# Patient Record
Sex: FEMALE | Race: WHITE | NOT HISPANIC OR LATINO
[De-identification: names, ages, dates, MRNs, and addresses within clinical notes are randomized per-mention and may not be internally consistent; named-entity substitution may affect disease eponyms.]

---

## 2019-03-05 PROBLEM — Z00.00 ENCOUNTER FOR PREVENTIVE HEALTH EXAMINATION: Status: ACTIVE | Noted: 2019-03-05

## 2019-03-11 PROBLEM — M25.561 CHRONIC PAIN OF BOTH KNEES: Status: ACTIVE | Noted: 2019-03-11

## 2019-03-12 ENCOUNTER — APPOINTMENT (OUTPATIENT)
Dept: ORTHOPEDIC SURGERY | Facility: CLINIC | Age: 59
End: 2019-03-12
Payer: COMMERCIAL

## 2019-03-12 VITALS
DIASTOLIC BLOOD PRESSURE: 84 MMHG | SYSTOLIC BLOOD PRESSURE: 135 MMHG | HEART RATE: 78 BPM | WEIGHT: 182 LBS | BODY MASS INDEX: 36.69 KG/M2 | HEIGHT: 59 IN

## 2019-03-12 DIAGNOSIS — G89.29 PAIN IN RIGHT KNEE: ICD-10-CM

## 2019-03-12 DIAGNOSIS — M25.561 PAIN IN RIGHT KNEE: ICD-10-CM

## 2019-03-12 DIAGNOSIS — M25.562 PAIN IN RIGHT KNEE: ICD-10-CM

## 2019-03-12 PROCEDURE — 73564 X-RAY EXAM KNEE 4 OR MORE: CPT | Mod: 50

## 2019-03-12 PROCEDURE — 99204 OFFICE O/P NEW MOD 45 MIN: CPT | Mod: 25

## 2019-03-12 PROCEDURE — 20610 DRAIN/INJ JOINT/BURSA W/O US: CPT | Mod: 50

## 2019-03-12 RX ORDER — VALACYCLOVIR 1 G/1
1 TABLET, FILM COATED ORAL
Qty: 20 | Refills: 0 | Status: ACTIVE | COMMUNITY
Start: 2018-10-25

## 2019-03-12 RX ORDER — OMEPRAZOLE 20 MG/1
20 CAPSULE, DELAYED RELEASE ORAL
Qty: 30 | Refills: 0 | Status: ACTIVE | COMMUNITY
Start: 2018-11-19

## 2019-03-12 RX ORDER — CEFDINIR 300 MG/1
300 CAPSULE ORAL
Qty: 20 | Refills: 0 | Status: ACTIVE | COMMUNITY
Start: 2019-01-22

## 2019-03-12 RX ORDER — LOSARTAN POTASSIUM AND HYDROCHLOROTHIAZIDE 12.5; 5 MG/1; MG/1
50-12.5 TABLET ORAL
Qty: 90 | Refills: 0 | Status: ACTIVE | COMMUNITY
Start: 2017-10-24

## 2019-03-12 RX ORDER — PRAVASTATIN SODIUM 20 MG/1
20 TABLET ORAL
Qty: 90 | Refills: 0 | Status: ACTIVE | COMMUNITY
Start: 2017-10-24

## 2019-03-12 RX ORDER — ROSUVASTATIN CALCIUM 5 MG/1
5 TABLET, FILM COATED ORAL
Qty: 90 | Refills: 0 | Status: ACTIVE | COMMUNITY
Start: 2018-10-25

## 2019-03-12 RX ORDER — MELOXICAM 15 MG/1
15 TABLET ORAL DAILY
Qty: 30 | Refills: 2 | Status: ACTIVE | COMMUNITY
Start: 2019-03-12 | End: 1900-01-01

## 2019-03-12 NOTE — HISTORY OF PRESENT ILLNESS
[de-identified] : This is very nice 59-year-old female experiencing chronic bilateral knee pain, which is severe in intensity.  There is more pain in the right and left knee.  The pain substantially limits activities of daily living. Walking tolerance is reduced.  Aleve does not help to improve the pain although she only takes this intermittently.  She has difficulty walking up and down stairs and her knee is giving way.  She does not use a cane or walker.  She does not use a knee brace.  The pain does not radiate down the leg to the feet and it is not associated with numbness or tingling or weakness.  She has not tried physical therapy for this.  She has not tried an intra-articular cortisone injection.

## 2019-03-12 NOTE — PHYSICAL EXAM
[de-identified] : Patient is well nourished, well-developed, in no acute distress, with appropriate mood and affect. The patient is oriented to time, place, and person. Respirations are even and unlabored. Gait evaluation does reveal a limp. There is no inguinal adenopathy. Bilateral limbs are well-perfused, without skin lesions, shows a grossly normal motor and sensory examination. The right knee motion is significantly reduced and does cause significant pain. The right knee moves from 0-125 degrees. The knee is stable within that range-of-motion to AP and ML stress. The alignment of the knee is 5 degrees varus. Muscle strength is normal. Pedal pulses are palpable. Hip examination was negative. The left knee motion is significantly reduced and does cause significant pain. The left knee moves from 0-125 degrees. The knee is stable within that range-of-motion to AP and ML stress. The alignment of the knee is 5 degrees varus. Muscle strength is normal. Pedal pulses are palpable. Hip examination was negative. [de-identified] : Long standing knee, AP knee, lateral knee, and patellar views of the bilateral knee were ordered and taken in the office and demonstrate degenerative joint disease of the knee with joint space narrowing, osteophyte formation, and subchondral sclerosis.

## 2019-03-12 NOTE — DISCUSSION/SUMMARY
[de-identified] : This patient has bilateral knee osteoarthritis.  The patient is not an appropriate candidate for total knee arthroplasty at this time. An extensive discussion was conducted on the natural history of the disease and the variety of surgical and non-surgical options available to the patient including, but not limited to non-steroidal anti-inflammatory medications, steroid injections, physical therapy, maintenance of ideal body weight, and reduction of activity.  I recommended a course of Mobic and physical therapy.  I also recommended use of a neoprene sleeve knee brace.  Today we performed a bilateral knee intra-articular cortisone injection.  The patient will schedule an appointment as needed.\par \par Informed consent for the bilateral knee injection was obtained. All questions were answered. A time out was performed. The bilateral knee was prepped and draped in sterile fashion. Using sterile technique, the bilateral knee was injection of 1 cc of Kenalog, 4cc of 1% lidocaine, 2cc of 0.5% marcaine using a 21-gauge needle. A sterile dressing was applied. Post injection instructions were reviewed. The patient tolerated the procedure well.\par

## 2019-05-21 ENCOUNTER — APPOINTMENT (OUTPATIENT)
Dept: ORTHOPEDIC SURGERY | Facility: CLINIC | Age: 59
End: 2019-05-21
Payer: COMMERCIAL

## 2019-05-21 PROCEDURE — 99214 OFFICE O/P EST MOD 30 MIN: CPT

## 2019-05-22 NOTE — PHYSICAL EXAM
[de-identified] : Patient is well nourished, well-developed, in no acute distress, with appropriate mood and affect. The patient is oriented to time, place, and person. Respirations are even and unlabored. Gait evaluation does reveal a limp. There is no inguinal adenopathy. Bilateral limbs are well-perfused, without skin lesions, shows a grossly normal motor and sensory examination. The right knee motion is significantly reduced and does cause significant pain. The right knee moves from 0-125 degrees. The knee is stable within that range-of-motion to AP and ML stress. The alignment of the knee is 5 degrees varus. Muscle strength is normal. Pedal pulses are palpable. Hip examination was negative. The left knee motion is significantly reduced and does cause significant pain. The left knee moves from 0-125 degrees. The knee is stable within that range-of-motion to AP and ML stress. The alignment of the knee is 5 degrees varus. Muscle strength is normal. Pedal pulses are palpable. Hip examination was negative. [de-identified] : Long standing knee, AP knee, lateral knee, and patellar views of the bilateral knee were reviewed from the previous visit and demonstrate severe degenerative joint disease of the knee with joint space narrowing, osteophyte formation, and subchondral sclerosis.

## 2019-05-22 NOTE — HISTORY OF PRESENT ILLNESS
[de-identified] : This is very nice 59-year-old female experiencing chronic bilateral knee pain, which is severe in intensity.  She is here for a reevaluation of her pain.  I have previously diagnosed her with severe bilateral knee osteoarthritis.  There is more pain in the right and left knee.  The pain substantially limits activities of daily living. Walking tolerance is reduced.  Mobic does help mildly.  She has difficulty walking up and down stairs and her knee is giving way.  She does not use a cane or walker.  A knee brace has been helpful in improving her symptoms.  The pain does not radiate down the leg to the feet and it is not associated with numbness or tingling or weakness.  She tried physical therapy which did help somewhat.  At the last visit we tried an intra-articular cortisone injection which not only did not help but caused her an adverse reaction of flushing and feeling unwell for the next 48 hours after the injection.  Symptoms are intolerable at this time in the bilateral knees and she would like to proceed with surgery.

## 2019-05-22 NOTE — DISCUSSION/SUMMARY
[de-identified] : This patient has severe bilateral knee osteoarthritis in the right knee is more symptomatic than the left.  She has failed a course of conservative management and would like to proceed with a right total knee arthroplasty.  We will utilize computer navigation for assistance given her short stature making it difficult to properly produce intraoperative bone cuts.  After allowing a minimum of 3 months for healing we will then proceed with a staged left total knee arthroplasty.\par \par The patient is an appropriate candidate for consideration of right total knee replacement. An extensive discussion was conducted of the natural history of the disease and the variety of surgical and non-surgical treatment options available to the patient. A risk/benefit analysis was discussed with the patient reviewing the advantages and disadvantages of surgical intervention at this time. Both the level and length of the patient's pain have made additional conservative treatment measures consisting of physical therapy, corticosteroids, and/or viscosupplementation contraindicated. A full explanation was given of the nature and the purpose of the procedure and anesthesia, its benefits, possible alternative methods of diagnosis or treatment, the risks involved, the possibility of complications, the foreseeable consequences of the procedure and the possible results of the non-treatment.\par \par No guarantee or assurance was made as to the results that may be obtained. Specifically, the risks were identified to include, but are not limited to the following: Infection, phlebitis, pulmonary embolism, death, paralysis, dislocation, pain, stiffness, instability, limp, weakness, breakage, leg-length inequality, uncontrolled bleeding, nerve injury, blood vessel injury, pressure sores, anesthetic risks, delayed healing of wound and bone, and wear and loosening. Further discussion was undertaken with the patient about the details of surgical preparation, treatment, and postoperative rehabilitation including medical clearance, autotransfusion, the hospital course, and the postoperative rehabilitation involved. All in all, I feel that this patient is a good candidate for surgical reconstruction.\par \par The patient does understand that given her young age these implants have a limited life expectancy.  She may require 1 or 2 revisions of her total knee arthroplasty in her lifetime.

## 2019-07-11 ENCOUNTER — OUTPATIENT (OUTPATIENT)
Dept: OUTPATIENT SERVICES | Facility: HOSPITAL | Age: 59
LOS: 1 days | Discharge: HOME | End: 2019-07-11

## 2019-07-11 VITALS
HEIGHT: 59 IN | TEMPERATURE: 98 F | HEART RATE: 67 BPM | WEIGHT: 180.56 LBS | RESPIRATION RATE: 16 BRPM | DIASTOLIC BLOOD PRESSURE: 82 MMHG | OXYGEN SATURATION: 97 % | SYSTOLIC BLOOD PRESSURE: 129 MMHG

## 2019-07-11 VITALS
SYSTOLIC BLOOD PRESSURE: 129 MMHG | OXYGEN SATURATION: 97 % | TEMPERATURE: 98 F | HEART RATE: 67 BPM | HEIGHT: 59.75 IN | WEIGHT: 180.56 LBS | DIASTOLIC BLOOD PRESSURE: 82 MMHG | RESPIRATION RATE: 16 BRPM

## 2019-07-11 DIAGNOSIS — M17.11 UNILATERAL PRIMARY OSTEOARTHRITIS, RIGHT KNEE: ICD-10-CM

## 2019-07-11 DIAGNOSIS — Z98.51 TUBAL LIGATION STATUS: Chronic | ICD-10-CM

## 2019-07-11 DIAGNOSIS — Z90.49 ACQUIRED ABSENCE OF OTHER SPECIFIED PARTS OF DIGESTIVE TRACT: Chronic | ICD-10-CM

## 2019-07-11 DIAGNOSIS — G47.33 OBSTRUCTIVE SLEEP APNEA (ADULT) (PEDIATRIC): ICD-10-CM

## 2019-07-11 DIAGNOSIS — Z01.818 ENCOUNTER FOR OTHER PREPROCEDURAL EXAMINATION: ICD-10-CM

## 2019-07-11 LAB
ALBUMIN SERPL ELPH-MCNC: 4 G/DL — SIGNIFICANT CHANGE UP (ref 3.3–5)
ALP SERPL-CCNC: 42 U/L — SIGNIFICANT CHANGE UP (ref 40–120)
ALT FLD-CCNC: 37 U/L — SIGNIFICANT CHANGE UP (ref 12–78)
ANION GAP SERPL CALC-SCNC: 7 MMOL/L — SIGNIFICANT CHANGE UP (ref 5–17)
APTT BLD: 31.6 SEC — SIGNIFICANT CHANGE UP (ref 27.5–36.3)
AST SERPL-CCNC: 26 U/L — SIGNIFICANT CHANGE UP (ref 15–37)
BILIRUB SERPL-MCNC: 0.8 MG/DL — SIGNIFICANT CHANGE UP (ref 0.2–1.2)
BUN SERPL-MCNC: 13 MG/DL — SIGNIFICANT CHANGE UP (ref 7–23)
CALCIUM SERPL-MCNC: 9 MG/DL — SIGNIFICANT CHANGE UP (ref 8.5–10.1)
CHLORIDE SERPL-SCNC: 106 MMOL/L — SIGNIFICANT CHANGE UP (ref 96–108)
CO2 SERPL-SCNC: 29 MMOL/L — SIGNIFICANT CHANGE UP (ref 22–31)
CREAT SERPL-MCNC: 0.88 MG/DL — SIGNIFICANT CHANGE UP (ref 0.5–1.3)
GLUCOSE SERPL-MCNC: 101 MG/DL — HIGH (ref 70–99)
HBA1C BLD-MCNC: 5.5 % — SIGNIFICANT CHANGE UP (ref 4–5.6)
HCT VFR BLD CALC: 40.7 % — SIGNIFICANT CHANGE UP (ref 34.5–45)
HCV AB S/CO SERPL IA: 0.06 S/CO — SIGNIFICANT CHANGE UP (ref 0–0.99)
HCV AB SERPL-IMP: SIGNIFICANT CHANGE UP
HGB BLD-MCNC: 13.8 G/DL — SIGNIFICANT CHANGE UP (ref 11.5–15.5)
HIV 1 & 2 AB SERPL IA.RAPID: SIGNIFICANT CHANGE UP
INR BLD: 1.03 RATIO — SIGNIFICANT CHANGE UP (ref 0.88–1.16)
MCHC RBC-ENTMCNC: 29.1 PG — SIGNIFICANT CHANGE UP (ref 27–34)
MCHC RBC-ENTMCNC: 33.9 GM/DL — SIGNIFICANT CHANGE UP (ref 32–36)
MCV RBC AUTO: 85.7 FL — SIGNIFICANT CHANGE UP (ref 80–100)
MRSA PCR RESULT.: SIGNIFICANT CHANGE UP
NRBC # BLD: 0 /100 WBCS — SIGNIFICANT CHANGE UP (ref 0–0)
PLATELET # BLD AUTO: 249 K/UL — SIGNIFICANT CHANGE UP (ref 150–400)
POTASSIUM SERPL-MCNC: 3.8 MMOL/L — SIGNIFICANT CHANGE UP (ref 3.5–5.3)
POTASSIUM SERPL-SCNC: 3.8 MMOL/L — SIGNIFICANT CHANGE UP (ref 3.5–5.3)
PROT SERPL-MCNC: 7.5 GM/DL — SIGNIFICANT CHANGE UP (ref 6–8.3)
PROTHROM AB SERPL-ACNC: 11.5 SEC — SIGNIFICANT CHANGE UP (ref 10–12.9)
RBC # BLD: 4.75 M/UL — SIGNIFICANT CHANGE UP (ref 3.8–5.2)
RBC # FLD: 13.1 % — SIGNIFICANT CHANGE UP (ref 10.3–14.5)
S AUREUS DNA NOSE QL NAA+PROBE: SIGNIFICANT CHANGE UP
SODIUM SERPL-SCNC: 142 MMOL/L — SIGNIFICANT CHANGE UP (ref 135–145)
WBC # BLD: 6.13 K/UL — SIGNIFICANT CHANGE UP (ref 3.8–10.5)
WBC # FLD AUTO: 6.13 K/UL — SIGNIFICANT CHANGE UP (ref 3.8–10.5)

## 2019-07-11 RX ORDER — ROSUVASTATIN CALCIUM 5 MG/1
1 TABLET ORAL
Qty: 0 | Refills: 0 | DISCHARGE

## 2019-07-11 NOTE — H&P PST ADULT - ASSESSMENT
scheduled for a right total knee replacement on  with Dr. Danoff CAPRINI SCORE [CLOT]    AGE RELATED RISK FACTORS                                                       MOBILITY RELATED FACTORS  [ ] Age 41-60 years                                            (1 Point)                  [ ] Bed rest                                                        (1 Point)  [ ] Age: 61-74 years                                           (2 Points)                 [ ] Plaster cast                                                   (2 Points)  [ ] Age= 75 years                                              (3 Points)                 [ ] Bed bound for more than 72 hours                 (2 Points)    DISEASE RELATED RISK FACTORS                                               GENDER SPECIFIC FACTORS  [ ] Edema in the lower extremities                       (1 Point)                  [ ] Pregnancy                                                     (1 Point)  [ ] Varicose veins                                               (1 Point)                  [ ] Post-partum < 6 weeks                                   (1 Point)             [ ] BMI > 25 Kg/m2                                            (1 Point)                  [ ] Hormonal therapy  or oral contraception          (1 Point)                 [ ] Sepsis (in the previous month)                        (1 Point)                  [ ] History of pregnancy complications                 (1 point)  [ ] Pneumonia or serious lung disease                                               [ ] Unexplained or recurrent                     (1 Point)           (in the previous month)                               (1 Point)  [ ] Abnormal pulmonary function test                     (1 Point)                 SURGERY RELATED RISK FACTORS  [ ] Acute myocardial infarction                              (1 Point)                 [ ]  Section                                             (1 Point)  [ ] Congestive heart failure (in the previous month)  (1 Point)               [ ] Minor surgery                                                  (1 Point)   [ ] Inflammatory bowel disease                             (1 Point)                 [ ] Arthroscopic surgery                                        (2 Points)  [ ] Central venous access                                      (2 Points)                [ ] General surgery lasting more than 45 minutes   (2 Points)       [ ] Stroke (in the previous month)                          (5 Points)               [ ] Elective arthroplasty                                         (5 Points)                                                                                                                                               HEMATOLOGY RELATED FACTORS                                                 TRAUMA RELATED RISK FACTORS  [ ] Prior episodes of VTE                                     (3 Points)                [ ] Fracture of the hip, pelvis, or leg                       (5 Points)  [ ] Positive family history for VTE                         (3 Points)                 [ ] Acute spinal cord injury (in the previous month)  (5 Points)  [ ] Prothrombin 69016 A                                     (3 Points)                 [ ] Paralysis  (less than 1 month)                             (5 Points)  [ ] Factor V Leiden                                             (3 Points)                  [ ] Multiple Trauma within 1 month                        (5 Points)  [ ] Lupus anticoagulants                                     (3 Points)                                                           [ ] Anticardiolipin antibodies                               (3 Points)                                                       [ ] High homocysteine in the blood                      (3 Points)                                             [ ] Other congenital or acquired thrombophilia      (3 Points)                                                [ ] Heparin induced thrombocytopenia                  (3 Points)                                          Total Score [          ] 58 yo female with OA of the right knee scheduled for a right total knee replacement on  with Dr. Danoff CAPRINI SCORE [CLOT]    AGE RELATED RISK FACTORS                                                       MOBILITY RELATED FACTORS  [ x] Age 41-60 years                                            (1 Point)                  [ ] Bed rest                                                        (1 Point)  [ ] Age: 61-74 years                                           (2 Points)                 [ ] Plaster cast                                                   (2 Points)  [ ] Age= 75 years                                              (3 Points)                 [ ] Bed bound for more than 72 hours                 (2 Points)    DISEASE RELATED RISK FACTORS                                               GENDER SPECIFIC FACTORS  [ ] Edema in the lower extremities                       (1 Point)                  [ ] Pregnancy                                                     (1 Point)  [ ] Varicose veins                                               (1 Point)                  [ ] Post-partum < 6 weeks                                   (1 Point)             [ x] BMI > 25 Kg/m2                                            (1 Point)                  [ ] Hormonal therapy  or oral contraception          (1 Point)                 [ ] Sepsis (in the previous month)                        (1 Point)                  [ ] History of pregnancy complications                 (1 point)  [ ] Pneumonia or serious lung disease                                               [ ] Unexplained or recurrent                     (1 Point)           (in the previous month)                               (1 Point)  [ ] Abnormal pulmonary function test                     (1 Point)                 SURGERY RELATED RISK FACTORS  [ ] Acute myocardial infarction                              (1 Point)                 [ ]  Section                                             (1 Point)  [ ] Congestive heart failure (in the previous month)  (1 Point)               [ ] Minor surgery                                                  (1 Point)   [ ] Inflammatory bowel disease                             (1 Point)                 [ ] Arthroscopic surgery                                        (2 Points)  [ ] Central venous access                                      (2 Points)                [ ] General surgery lasting more than 45 minutes   (2 Points)       [ ] Stroke (in the previous month)                          (5 Points)               [x ] Elective arthroplasty                                         (5 Points)                                                                                                                                               HEMATOLOGY RELATED FACTORS                                                 TRAUMA RELATED RISK FACTORS  [ ] Prior episodes of VTE                                     (3 Points)                [ ] Fracture of the hip, pelvis, or leg                       (5 Points)  [ ] Positive family history for VTE                         (3 Points)                 [ ] Acute spinal cord injury (in the previous month)  (5 Points)  [ ] Prothrombin 47745 A                                     (3 Points)                 [ ] Paralysis  (less than 1 month)                             (5 Points)  [ ] Factor V Leiden                                             (3 Points)                  [ ] Multiple Trauma within 1 month                        (5 Points)  [ ] Lupus anticoagulants                                     (3 Points)                                                           [ ] Anticardiolipin antibodies                               (3 Points)                                                       [ ] High homocysteine in the blood                      (3 Points)                                             [ ] Other congenital or acquired thrombophilia      (3 Points)                                                [ ] Heparin induced thrombocytopenia                  (3 Points)                                          Total Score [        7  ]

## 2019-07-11 NOTE — H&P PST ADULT - HISTORY OF PRESENT ILLNESS
60 yo female with HTN ans JESSE, presents to PST scheduled for a right total knee replacement on 8/2 with Dr. Alexis 60 yo obese female with HTN ans JESSE, presents to PST scheduled for a right total knee replacement on 8/2 with Dr. Alexis. C/O pain with ROM. Denies pain today.

## 2019-07-11 NOTE — OCCUPATIONAL THERAPY INITIAL EVALUATION ADULT - TRANSFER SAFETY CONCERNS NOTED: SIT/STAND, REHAB EVAL
decreased sequencing ability/decreased balance during turns/decreased weight-shifting ability/decreased step length

## 2019-07-11 NOTE — OCCUPATIONAL THERAPY INITIAL EVALUATION ADULT - FINE MOTOR COORDINATION, FINE MOTOR COORDINATION TESTS, OT EVAL
Patient-specific activity scoring scheme (Point to one number): 0 -------5-------- 10 (0) =Unable to perform activity (10) -- Able to perform activity at the same level as before injury or problem. Activity: static positions______5______

## 2019-07-11 NOTE — OCCUPATIONAL THERAPY INITIAL EVALUATION ADULT - SOCIAL CONCERNS
None/Patients spouse is able to assist after surgery with any daily tasks and needs when patient comes home

## 2019-07-11 NOTE — OCCUPATIONAL THERAPY INITIAL EVALUATION ADULT - ADDITIONAL COMMENTS
Patient lives in a private house with 7-8 steps to enter with bilateral handrails that are close together. Once inside, the patients bedroom and bathroom is on the main level when entering. The patient bathroom has a walk in shower stall with a retractable shower head with a regular toilet. The patient reports that she can fit a 3/1 commode over the toilet. The patient ambulates with no device and does not own any device for ambulation. The patient is R handed, drives and does not wear glasses. The patient daily pain at rest is a 5/10 and a 9/10 with movement. The patient reports taking no pain medicine and uses ice and exercise.  The patient reports having (+) buckling and (+) falls history.

## 2019-07-11 NOTE — H&P PST ADULT - NSICDXPROBLEM_GEN_ALL_CORE_FT
PROBLEM DIAGNOSES  Problem: Unilateral primary osteoarthritis, right knee  Assessment and Plan:     Problem: Pre-op evaluation  Assessment and Plan:     Problem: JESSE on CPAP  Assessment and Plan: PROBLEM DIAGNOSES  Problem: Unilateral primary osteoarthritis, right knee  Assessment and Plan: scheduled for a right total knee replacement on 8/2 with Dr. Alexis      Problem: Pre-op evaluation  Assessment and Plan: Labs-CBC, CMP, PT/PTT, T&S, A1c, Nose cx, and EKG  MC with Dr. aFm  Pre op and 3 day of Hibiclens instructions reviewed and given. Hold diuretic DOS. Take routine am Omeprazole DOS with sip of water. Avoid NSAIDs and OTC supplements. Verbalized understanding     Problem: JESSE on CPAP  Assessment and Plan: Close post op monitoring. Advised of a possible extended stay.

## 2019-07-11 NOTE — PHYSICAL THERAPY INITIAL EVALUATION ADULT - MODIFIED CLINICAL TEST OF SENSORY INTEGRATION IN BALANCE TEST
5x Sit to Stand Test = (no hands use) 20.34 seconds, indicating significant impairment c functional mobility & strength  ; 2 Minute Walk Test = 300 feet without devices or rest stops

## 2019-07-11 NOTE — PHYSICAL THERAPY INITIAL EVALUATION ADULT - ADDITIONAL COMMENTS
Pt lives in a private house with 7-8 steps down to basement with B handrails close together. Once inside there are no further steps to negotiate. Pt has a walk in shower, no grab bars, retractable shower, standard height toilet. Pt reports that a 3:1 commode can fit over toilet. (+) hx of falls and buckling. Pt does not have/use dme. Pain is 5/10 at rest and can increase to 9/10, worse with static positions, ambulation. Pt gets relief with ice and exercise, does not take pain medication. 12 sessions of PT were completed approximately 1 1/2 months ago. Pt does not wear glasses, R hand dominant, drives, does not wear hearing aids.

## 2019-07-11 NOTE — H&P PST ADULT - NSICDXPASTMEDICALHX_GEN_ALL_CORE_FT
PAST MEDICAL HISTORY:  Fatty liver     HTN (hypertension)     JESSE on CPAP     Unilateral primary osteoarthritis, right knee PAST MEDICAL HISTORY:  Fatty liver     HTN (hypertension)     Obese     JESSE on CPAP     Unilateral primary osteoarthritis, right knee

## 2019-07-11 NOTE — H&P PST ADULT - NSICDXPASTSURGICALHX_GEN_ALL_CORE_FT
PAST SURGICAL HISTORY:  H/O tubal ligation     History of appendectomy     History of cholecystectomy

## 2019-07-29 PROBLEM — M17.11 ARTHRITIS OF KNEE, RIGHT: Status: RESOLVED | Noted: 2019-03-12 | Resolved: 2019-07-29

## 2019-08-01 ENCOUNTER — TRANSCRIPTION ENCOUNTER (OUTPATIENT)
Age: 59
End: 2019-08-01

## 2019-08-02 ENCOUNTER — RESULT REVIEW (OUTPATIENT)
Age: 59
End: 2019-08-02

## 2019-08-02 ENCOUNTER — TRANSCRIPTION ENCOUNTER (OUTPATIENT)
Age: 59
End: 2019-08-02

## 2019-08-02 ENCOUNTER — APPOINTMENT (OUTPATIENT)
Dept: ORTHOPEDIC SURGERY | Facility: HOSPITAL | Age: 59
End: 2019-08-02

## 2019-08-02 ENCOUNTER — INPATIENT (INPATIENT)
Facility: HOSPITAL | Age: 59
LOS: 0 days | Discharge: HOME | End: 2019-08-03
Attending: ORTHOPAEDIC SURGERY | Admitting: ORTHOPAEDIC SURGERY
Payer: COMMERCIAL

## 2019-08-02 VITALS
DIASTOLIC BLOOD PRESSURE: 79 MMHG | HEART RATE: 88 BPM | HEIGHT: 59 IN | WEIGHT: 179.9 LBS | OXYGEN SATURATION: 98 % | RESPIRATION RATE: 17 BRPM | TEMPERATURE: 97 F | SYSTOLIC BLOOD PRESSURE: 140 MMHG

## 2019-08-02 DIAGNOSIS — M17.11 UNILATERAL PRIMARY OSTEOARTHRITIS, RIGHT KNEE: ICD-10-CM

## 2019-08-02 DIAGNOSIS — Z98.51 TUBAL LIGATION STATUS: Chronic | ICD-10-CM

## 2019-08-02 DIAGNOSIS — Z90.49 ACQUIRED ABSENCE OF OTHER SPECIFIED PARTS OF DIGESTIVE TRACT: Chronic | ICD-10-CM

## 2019-08-02 PROCEDURE — 20985 CPTR-ASST DIR MS PX: CPT

## 2019-08-02 PROCEDURE — 88305 TISSUE EXAM BY PATHOLOGIST: CPT | Mod: 26

## 2019-08-02 PROCEDURE — 73560 X-RAY EXAM OF KNEE 1 OR 2: CPT | Mod: 26,RT

## 2019-08-02 PROCEDURE — 27447 TOTAL KNEE ARTHROPLASTY: CPT | Mod: RT

## 2019-08-02 PROCEDURE — 88311 DECALCIFY TISSUE: CPT | Mod: 26

## 2019-08-02 RX ORDER — OXYCODONE HYDROCHLORIDE 5 MG/1
5 TABLET ORAL ONCE
Refills: 0 | Status: DISCONTINUED | OUTPATIENT
Start: 2019-08-02 | End: 2019-08-02

## 2019-08-02 RX ORDER — HYDROCHLOROTHIAZIDE 25 MG
12.5 TABLET ORAL DAILY
Refills: 0 | Status: DISCONTINUED | OUTPATIENT
Start: 2019-08-02 | End: 2019-08-03

## 2019-08-02 RX ORDER — GABAPENTIN 400 MG/1
300 CAPSULE ORAL ONCE
Refills: 0 | Status: COMPLETED | OUTPATIENT
Start: 2019-08-02 | End: 2019-08-02

## 2019-08-02 RX ORDER — FOLIC ACID 0.8 MG
1 TABLET ORAL DAILY
Refills: 0 | Status: DISCONTINUED | OUTPATIENT
Start: 2019-08-02 | End: 2019-08-03

## 2019-08-02 RX ORDER — OXYCODONE HYDROCHLORIDE 5 MG/1
10 TABLET ORAL EVERY 4 HOURS
Refills: 0 | Status: DISCONTINUED | OUTPATIENT
Start: 2019-08-02 | End: 2019-08-03

## 2019-08-02 RX ORDER — SODIUM CHLORIDE 9 MG/ML
3 INJECTION INTRAMUSCULAR; INTRAVENOUS; SUBCUTANEOUS EVERY 8 HOURS
Refills: 0 | Status: DISCONTINUED | OUTPATIENT
Start: 2019-08-02 | End: 2019-08-02

## 2019-08-02 RX ORDER — PANTOPRAZOLE SODIUM 20 MG/1
1 TABLET, DELAYED RELEASE ORAL
Qty: 60 | Refills: 0
Start: 2019-08-02 | End: 2019-08-31

## 2019-08-02 RX ORDER — MAGNESIUM HYDROXIDE 400 MG/1
30 TABLET, CHEWABLE ORAL DAILY
Refills: 0 | Status: DISCONTINUED | OUTPATIENT
Start: 2019-08-02 | End: 2019-08-03

## 2019-08-02 RX ORDER — VITAMIN E 100 UNIT
0 CAPSULE ORAL
Qty: 0 | Refills: 0 | DISCHARGE

## 2019-08-02 RX ORDER — OMEPRAZOLE 10 MG/1
0 CAPSULE, DELAYED RELEASE ORAL
Qty: 0 | Refills: 0 | DISCHARGE

## 2019-08-02 RX ORDER — OXYCODONE HYDROCHLORIDE 5 MG/1
1 TABLET ORAL
Qty: 42 | Refills: 0
Start: 2019-08-02 | End: 2019-08-08

## 2019-08-02 RX ORDER — ACETAMINOPHEN 500 MG
2 TABLET ORAL
Qty: 168 | Refills: 0
Start: 2019-08-02 | End: 2019-08-29

## 2019-08-02 RX ORDER — LANOLIN ALCOHOL/MO/W.PET/CERES
3 CREAM (GRAM) TOPICAL AT BEDTIME
Refills: 0 | Status: DISCONTINUED | OUTPATIENT
Start: 2019-08-02 | End: 2019-08-03

## 2019-08-02 RX ORDER — BENZOCAINE AND MENTHOL 5; 1 G/100ML; G/100ML
1 LIQUID ORAL EVERY 4 HOURS
Refills: 0 | Status: DISCONTINUED | OUTPATIENT
Start: 2019-08-02 | End: 2019-08-03

## 2019-08-02 RX ORDER — DIPHENHYDRAMINE HCL 50 MG
25 CAPSULE ORAL EVERY 4 HOURS
Refills: 0 | Status: DISCONTINUED | OUTPATIENT
Start: 2019-08-02 | End: 2019-08-03

## 2019-08-02 RX ORDER — DOCUSATE SODIUM 100 MG
100 CAPSULE ORAL THREE TIMES A DAY
Refills: 0 | Status: DISCONTINUED | OUTPATIENT
Start: 2019-08-02 | End: 2019-08-03

## 2019-08-02 RX ORDER — HYDROMORPHONE HYDROCHLORIDE 2 MG/ML
0.5 INJECTION INTRAMUSCULAR; INTRAVENOUS; SUBCUTANEOUS
Refills: 0 | Status: DISCONTINUED | OUTPATIENT
Start: 2019-08-02 | End: 2019-08-02

## 2019-08-02 RX ORDER — ACETAMINOPHEN 500 MG
975 TABLET ORAL ONCE
Refills: 0 | Status: COMPLETED | OUTPATIENT
Start: 2019-08-02 | End: 2019-08-02

## 2019-08-02 RX ORDER — CELECOXIB 200 MG/1
400 CAPSULE ORAL ONCE
Refills: 0 | Status: COMPLETED | OUTPATIENT
Start: 2019-08-02 | End: 2019-08-02

## 2019-08-02 RX ORDER — SENNA PLUS 8.6 MG/1
2 TABLET ORAL AT BEDTIME
Refills: 0 | Status: DISCONTINUED | OUTPATIENT
Start: 2019-08-02 | End: 2019-08-03

## 2019-08-02 RX ORDER — METOCLOPRAMIDE HCL 10 MG
10 TABLET ORAL ONCE
Refills: 0 | Status: DISCONTINUED | OUTPATIENT
Start: 2019-08-02 | End: 2019-08-02

## 2019-08-02 RX ORDER — TRAMADOL HYDROCHLORIDE 50 MG/1
1 TABLET ORAL
Qty: 28 | Refills: 0
Start: 2019-08-02 | End: 2019-08-08

## 2019-08-02 RX ORDER — SODIUM CHLORIDE 9 MG/ML
1000 INJECTION, SOLUTION INTRAVENOUS
Refills: 0 | Status: DISCONTINUED | OUTPATIENT
Start: 2019-08-02 | End: 2019-08-03

## 2019-08-02 RX ORDER — ASPIRIN/CALCIUM CARB/MAGNESIUM 324 MG
1 TABLET ORAL
Qty: 60 | Refills: 0
Start: 2019-08-02 | End: 2019-08-31

## 2019-08-02 RX ORDER — ROSUVASTATIN CALCIUM 5 MG/1
1 TABLET ORAL
Qty: 0 | Refills: 0 | DISCHARGE

## 2019-08-02 RX ORDER — PANTOPRAZOLE SODIUM 20 MG/1
40 TABLET, DELAYED RELEASE ORAL
Refills: 0 | Status: DISCONTINUED | OUTPATIENT
Start: 2019-08-02 | End: 2019-08-03

## 2019-08-02 RX ORDER — LOSARTAN POTASSIUM 100 MG/1
50 TABLET, FILM COATED ORAL DAILY
Refills: 0 | Status: DISCONTINUED | OUTPATIENT
Start: 2019-08-02 | End: 2019-08-03

## 2019-08-02 RX ORDER — ASPIRIN/CALCIUM CARB/MAGNESIUM 324 MG
81 TABLET ORAL
Refills: 0 | Status: DISCONTINUED | OUTPATIENT
Start: 2019-08-02 | End: 2019-08-03

## 2019-08-02 RX ORDER — CELECOXIB 200 MG/1
400 CAPSULE ORAL EVERY 12 HOURS
Refills: 0 | Status: DISCONTINUED | OUTPATIENT
Start: 2019-08-04 | End: 2019-08-03

## 2019-08-02 RX ORDER — CEFAZOLIN SODIUM 1 G
2000 VIAL (EA) INJECTION EVERY 8 HOURS
Refills: 0 | Status: COMPLETED | OUTPATIENT
Start: 2019-08-02 | End: 2019-08-02

## 2019-08-02 RX ORDER — ATORVASTATIN CALCIUM 80 MG/1
20 TABLET, FILM COATED ORAL AT BEDTIME
Refills: 0 | Status: DISCONTINUED | OUTPATIENT
Start: 2019-08-02 | End: 2019-08-03

## 2019-08-02 RX ORDER — ACETAMINOPHEN 500 MG
1000 TABLET ORAL ONCE
Refills: 0 | Status: COMPLETED | OUTPATIENT
Start: 2019-08-02 | End: 2019-08-02

## 2019-08-02 RX ORDER — GABAPENTIN 400 MG/1
1 CAPSULE ORAL
Qty: 28 | Refills: 0
Start: 2019-08-02 | End: 2019-08-15

## 2019-08-02 RX ORDER — ONDANSETRON 8 MG/1
4 TABLET, FILM COATED ORAL EVERY 6 HOURS
Refills: 0 | Status: DISCONTINUED | OUTPATIENT
Start: 2019-08-02 | End: 2019-08-03

## 2019-08-02 RX ORDER — UBIDECARENONE 100 MG
1 CAPSULE ORAL
Qty: 0 | Refills: 0 | DISCHARGE

## 2019-08-02 RX ORDER — CELECOXIB 200 MG/1
1 CAPSULE ORAL
Qty: 28 | Refills: 0
Start: 2019-08-02 | End: 2019-08-15

## 2019-08-02 RX ORDER — TRAMADOL HYDROCHLORIDE 50 MG/1
50 TABLET ORAL EVERY 6 HOURS
Refills: 0 | Status: DISCONTINUED | OUTPATIENT
Start: 2019-08-02 | End: 2019-08-03

## 2019-08-02 RX ORDER — POLYETHYLENE GLYCOL 3350 17 G/17G
17 POWDER, FOR SOLUTION ORAL DAILY
Refills: 0 | Status: DISCONTINUED | OUTPATIENT
Start: 2019-08-02 | End: 2019-08-03

## 2019-08-02 RX ORDER — ASCORBIC ACID 60 MG
500 TABLET,CHEWABLE ORAL
Refills: 0 | Status: DISCONTINUED | OUTPATIENT
Start: 2019-08-02 | End: 2019-08-03

## 2019-08-02 RX ORDER — SCOPALAMINE 1 MG/3D
1 PATCH, EXTENDED RELEASE TRANSDERMAL ONCE
Refills: 0 | Status: COMPLETED | OUTPATIENT
Start: 2019-08-02 | End: 2019-08-02

## 2019-08-02 RX ORDER — OXYCODONE HYDROCHLORIDE 5 MG/1
5 TABLET ORAL EVERY 4 HOURS
Refills: 0 | Status: DISCONTINUED | OUTPATIENT
Start: 2019-08-02 | End: 2019-08-03

## 2019-08-02 RX ORDER — KETOROLAC TROMETHAMINE 30 MG/ML
30 SYRINGE (ML) INJECTION EVERY 8 HOURS
Refills: 0 | Status: DISCONTINUED | OUTPATIENT
Start: 2019-08-02 | End: 2019-08-03

## 2019-08-02 RX ORDER — GABAPENTIN 400 MG/1
300 CAPSULE ORAL EVERY 12 HOURS
Refills: 0 | Status: DISCONTINUED | OUTPATIENT
Start: 2019-08-02 | End: 2019-08-03

## 2019-08-02 RX ORDER — SODIUM CHLORIDE 9 MG/ML
1000 INJECTION, SOLUTION INTRAVENOUS
Refills: 0 | Status: DISCONTINUED | OUTPATIENT
Start: 2019-08-02 | End: 2019-08-02

## 2019-08-02 RX ORDER — LOSARTAN/HYDROCHLOROTHIAZIDE 100MG-25MG
1 TABLET ORAL
Qty: 0 | Refills: 0 | DISCHARGE

## 2019-08-02 RX ORDER — ACETAMINOPHEN 500 MG
975 TABLET ORAL EVERY 8 HOURS
Refills: 0 | Status: DISCONTINUED | OUTPATIENT
Start: 2019-08-02 | End: 2019-08-03

## 2019-08-02 RX ADMIN — CELECOXIB 400 MILLIGRAM(S): 200 CAPSULE ORAL at 07:25

## 2019-08-02 RX ADMIN — LOSARTAN POTASSIUM 50 MILLIGRAM(S): 100 TABLET, FILM COATED ORAL at 14:47

## 2019-08-02 RX ADMIN — SODIUM CHLORIDE 75 MILLILITER(S): 9 INJECTION, SOLUTION INTRAVENOUS at 21:31

## 2019-08-02 RX ADMIN — SODIUM CHLORIDE 75 MILLILITER(S): 9 INJECTION, SOLUTION INTRAVENOUS at 12:52

## 2019-08-02 RX ADMIN — SODIUM CHLORIDE 75 MILLILITER(S): 9 INJECTION, SOLUTION INTRAVENOUS at 10:49

## 2019-08-02 RX ADMIN — OXYCODONE HYDROCHLORIDE 5 MILLIGRAM(S): 5 TABLET ORAL at 07:25

## 2019-08-02 RX ADMIN — SCOPALAMINE 1 PATCH: 1 PATCH, EXTENDED RELEASE TRANSDERMAL at 07:25

## 2019-08-02 RX ADMIN — OXYCODONE HYDROCHLORIDE 10 MILLIGRAM(S): 5 TABLET ORAL at 13:50

## 2019-08-02 RX ADMIN — Medication 1000 MILLIGRAM(S): at 11:05

## 2019-08-02 RX ADMIN — Medication 500 MILLIGRAM(S): at 17:28

## 2019-08-02 RX ADMIN — Medication 100 MILLIGRAM(S): at 21:31

## 2019-08-02 RX ADMIN — Medication 30 MILLIGRAM(S): at 21:34

## 2019-08-02 RX ADMIN — OXYCODONE HYDROCHLORIDE 10 MILLIGRAM(S): 5 TABLET ORAL at 21:30

## 2019-08-02 RX ADMIN — GABAPENTIN 300 MILLIGRAM(S): 400 CAPSULE ORAL at 07:25

## 2019-08-02 RX ADMIN — OXYCODONE HYDROCHLORIDE 10 MILLIGRAM(S): 5 TABLET ORAL at 22:19

## 2019-08-02 RX ADMIN — OXYCODONE HYDROCHLORIDE 10 MILLIGRAM(S): 5 TABLET ORAL at 12:53

## 2019-08-02 RX ADMIN — Medication 100 MILLIGRAM(S): at 23:43

## 2019-08-02 RX ADMIN — SCOPALAMINE 1 PATCH: 1 PATCH, EXTENDED RELEASE TRANSDERMAL at 19:00

## 2019-08-02 RX ADMIN — Medication 100 MILLIGRAM(S): at 15:48

## 2019-08-02 RX ADMIN — Medication 100 MILLIGRAM(S): at 14:47

## 2019-08-02 RX ADMIN — Medication 975 MILLIGRAM(S): at 07:24

## 2019-08-02 RX ADMIN — Medication 975 MILLIGRAM(S): at 17:27

## 2019-08-02 RX ADMIN — Medication 81 MILLIGRAM(S): at 17:28

## 2019-08-02 RX ADMIN — Medication 12.5 MILLIGRAM(S): at 14:47

## 2019-08-02 RX ADMIN — ATORVASTATIN CALCIUM 20 MILLIGRAM(S): 80 TABLET, FILM COATED ORAL at 21:29

## 2019-08-02 RX ADMIN — Medication 30 MILLIGRAM(S): at 21:29

## 2019-08-02 RX ADMIN — Medication 400 MILLIGRAM(S): at 10:51

## 2019-08-02 RX ADMIN — Medication 975 MILLIGRAM(S): at 18:27

## 2019-08-02 RX ADMIN — GABAPENTIN 300 MILLIGRAM(S): 400 CAPSULE ORAL at 17:28

## 2019-08-02 NOTE — DISCHARGE NOTE PROVIDER - CARE PROVIDERS DIRECT ADDRESSES
[email or web address]

## 2019-08-02 NOTE — PROGRESS NOTE ADULT - ASSESSMENT
A/P: 60 yo F s/p right TKA POD #0    -Patient tolerated procedure well  -Continue with post op ABX  -DVT ppx: Aspirin 81 to start today, SCDs  -Pain control  -PT/OT  -WBAT RLE, OOB to chair with assistive devices as needed  -Dispo planning, possible home tomorrow pending PT eval  -Will d/w with attending and advise if plan changes

## 2019-08-02 NOTE — DISCHARGE NOTE PROVIDER - NSDCCPCAREPLAN_GEN_ALL_CORE_FT
PRINCIPAL DISCHARGE DIAGNOSIS  Diagnosis: Osteoarthritis of right knee  Assessment and Plan of Treatment:   1.	Pain Control  2.	Walking with full weight bearing as tolerated, with assistive devices (walker/Cane as Needed)  3.	DVT Prophylaxis for 30 days. Aspirin 81 twice a day.  4.	Home exercise program and outpatient PT to start 1 week after surgery.  5.	Follow up with Dr. Alexis as outpatient in 2 Weeks after discharge from the hospital. Call office for appointment.  6.	Remove Dressing Post-Op Day 10.  7.	Ice affected area as needed  8.	Keep dressing clean and dry.

## 2019-08-02 NOTE — PROGRESS NOTE ADULT - SUBJECTIVE AND OBJECTIVE BOX
Post-Op Note    Patient seen and examined in PACU. Patient tolerated procedure well. Denies numbness/tingling of the RLE. No complaints at this time.      Vital Signs Last 24 Hrs  T(C): 36.4 (02 Aug 2019 09:56), Max: 36.4 (02 Aug 2019 09:56)  T(F): 97.5 (02 Aug 2019 09:56), Max: 97.5 (02 Aug 2019 09:56)  HR: 98 (02 Aug 2019 10:33) (88 - 102)  BP: 126/69 (02 Aug 2019 10:33) (113/56 - 140/79)  BP(mean): --  RR: 14 (02 Aug 2019 10:33) (12 - 17)  SpO2: 98% (02 Aug 2019 10:33) (96% - 98%)    PHYSICAL EXAM:  GEN: NAD  RLE:   Dressing c/d/i  L2-S1 SILT  +TA/EHL/FHL/GSC  2+ DP/PT  Compartments soft and compressible  Calves nontender bilaterally

## 2019-08-02 NOTE — DISCHARGE NOTE PROVIDER - CARE PROVIDER_API CALL
Juice Alexis)  Orthopedics  [address], [address]  [place] [address]  Phone: [phone]  Fax: [phone]  Follow Up Time:

## 2019-08-02 NOTE — PHYSICAL THERAPY INITIAL EVALUATION ADULT - ACTIVE RANGE OF MOTION EXAMINATION, REHAB EVAL
bilateral upper extremity Active ROM was WFL (within functional limits)/R knee 5-90/Left LE Active ROM was WFL (within functional limits)

## 2019-08-02 NOTE — PHYSICAL THERAPY INITIAL EVALUATION ADULT - ADDITIONAL COMMENTS
As per pre-op and reviewed with patient POD #0 to ensure accuracy: Pt lives in a private house with 7-8 steps down to basement with B handrails close together, R rail down does not begin until third step. Once inside there are no further steps to negotiate. Pt has a walk in shower, no grab bars, retractable shower, standard height toilet. Pt reports that a 3:1 commode can fit over toilet. (+) hx of falls and buckling. Pt does not have/use dme. Pain is 5/10 at rest and can increase to 9/10, worse with static positions, ambulation. Pt gets relief with ice and exercise, does not take pain medication. 12 sessions of PT were completed approximately 1 1/2 months ago. Pt does not wear glasses, R hand dominant, drives, does not wear hearing aids.

## 2019-08-02 NOTE — PROGRESS NOTE ADULT - ATTENDING COMMENTS
I agree with the above note and have personally seen and examined this patient. All pertinent films have been reviewed. Please refer to clinical documentation of the history, physical examinations, data summary, and both assessment and plan as documented above and with which I agree.    Juice Alexis MD  Attending Orthopedic Surgeon

## 2019-08-02 NOTE — PHYSICAL THERAPY INITIAL EVALUATION ADULT - GAIT TRAINING, PT EVAL
Patient will ambulate 500 feet with rolling walker independently for community ambulation in 2-3 days. Patient will ascend/descend 8 steps with R rail up/L rail down sidestepping independently in 2-3 days to get in and out of home safely.

## 2019-08-02 NOTE — PHYSICAL THERAPY INITIAL EVALUATION ADULT - CRITERIA FOR SKILLED THERAPEUTIC INTERVENTIONS
rehab potential/anticipated discharge recommendation/predicted duration of therapy intervention/impairments found/functional limitations in following categories/risk reduction/prevention/therapy frequency

## 2019-08-03 ENCOUNTER — TRANSCRIPTION ENCOUNTER (OUTPATIENT)
Age: 59
End: 2019-08-03

## 2019-08-03 VITALS
DIASTOLIC BLOOD PRESSURE: 66 MMHG | RESPIRATION RATE: 17 BRPM | TEMPERATURE: 98 F | OXYGEN SATURATION: 96 % | HEART RATE: 75 BPM | SYSTOLIC BLOOD PRESSURE: 115 MMHG

## 2019-08-03 LAB
ANION GAP SERPL CALC-SCNC: 10 MMOL/L — SIGNIFICANT CHANGE UP (ref 5–17)
BUN SERPL-MCNC: 11 MG/DL — SIGNIFICANT CHANGE UP (ref 7–23)
CALCIUM SERPL-MCNC: 8.4 MG/DL — LOW (ref 8.5–10.1)
CHLORIDE SERPL-SCNC: 102 MMOL/L — SIGNIFICANT CHANGE UP (ref 96–108)
CO2 SERPL-SCNC: 27 MMOL/L — SIGNIFICANT CHANGE UP (ref 22–31)
CREAT SERPL-MCNC: 0.95 MG/DL — SIGNIFICANT CHANGE UP (ref 0.5–1.3)
GLUCOSE SERPL-MCNC: 106 MG/DL — HIGH (ref 70–99)
HCT VFR BLD CALC: 37 % — SIGNIFICANT CHANGE UP (ref 34.5–45)
HGB BLD-MCNC: 12.3 G/DL — SIGNIFICANT CHANGE UP (ref 11.5–15.5)
MCHC RBC-ENTMCNC: 28.6 PG — SIGNIFICANT CHANGE UP (ref 27–34)
MCHC RBC-ENTMCNC: 33.2 GM/DL — SIGNIFICANT CHANGE UP (ref 32–36)
MCV RBC AUTO: 86 FL — SIGNIFICANT CHANGE UP (ref 80–100)
NRBC # BLD: 0 /100 WBCS — SIGNIFICANT CHANGE UP (ref 0–0)
PLATELET # BLD AUTO: 319 K/UL — SIGNIFICANT CHANGE UP (ref 150–400)
POTASSIUM SERPL-MCNC: 3.5 MMOL/L — SIGNIFICANT CHANGE UP (ref 3.5–5.3)
POTASSIUM SERPL-SCNC: 3.5 MMOL/L — SIGNIFICANT CHANGE UP (ref 3.5–5.3)
RBC # BLD: 4.3 M/UL — SIGNIFICANT CHANGE UP (ref 3.8–5.2)
RBC # FLD: 13.2 % — SIGNIFICANT CHANGE UP (ref 10.3–14.5)
SODIUM SERPL-SCNC: 139 MMOL/L — SIGNIFICANT CHANGE UP (ref 135–145)
WBC # BLD: 19.06 K/UL — HIGH (ref 3.8–10.5)
WBC # FLD AUTO: 19.06 K/UL — HIGH (ref 3.8–10.5)

## 2019-08-03 RX ADMIN — Medication 975 MILLIGRAM(S): at 10:40

## 2019-08-03 RX ADMIN — Medication 1 TABLET(S): at 11:54

## 2019-08-03 RX ADMIN — GABAPENTIN 300 MILLIGRAM(S): 400 CAPSULE ORAL at 05:21

## 2019-08-03 RX ADMIN — Medication 500 MILLIGRAM(S): at 05:21

## 2019-08-03 RX ADMIN — Medication 12.5 MILLIGRAM(S): at 05:21

## 2019-08-03 RX ADMIN — Medication 975 MILLIGRAM(S): at 05:21

## 2019-08-03 RX ADMIN — Medication 1 MILLIGRAM(S): at 11:54

## 2019-08-03 RX ADMIN — POLYETHYLENE GLYCOL 3350 17 GRAM(S): 17 POWDER, FOR SOLUTION ORAL at 11:54

## 2019-08-03 RX ADMIN — SCOPALAMINE 1 PATCH: 1 PATCH, EXTENDED RELEASE TRANSDERMAL at 07:51

## 2019-08-03 RX ADMIN — Medication 975 MILLIGRAM(S): at 06:21

## 2019-08-03 RX ADMIN — PANTOPRAZOLE SODIUM 40 MILLIGRAM(S): 20 TABLET, DELAYED RELEASE ORAL at 08:41

## 2019-08-03 RX ADMIN — LOSARTAN POTASSIUM 50 MILLIGRAM(S): 100 TABLET, FILM COATED ORAL at 05:21

## 2019-08-03 RX ADMIN — SODIUM CHLORIDE 75 MILLILITER(S): 9 INJECTION, SOLUTION INTRAVENOUS at 05:21

## 2019-08-03 RX ADMIN — Medication 100 MILLIGRAM(S): at 05:21

## 2019-08-03 RX ADMIN — Medication 30 MILLIGRAM(S): at 05:35

## 2019-08-03 RX ADMIN — OXYCODONE HYDROCHLORIDE 5 MILLIGRAM(S): 5 TABLET ORAL at 12:01

## 2019-08-03 RX ADMIN — Medication 30 MILLIGRAM(S): at 05:20

## 2019-08-03 RX ADMIN — Medication 81 MILLIGRAM(S): at 05:21

## 2019-08-03 RX ADMIN — Medication 975 MILLIGRAM(S): at 10:14

## 2019-08-03 NOTE — PROGRESS NOTE ADULT - ASSESSMENT
58yo female s/p R TKA POD #1    -Pain control  -DVT ppx: SCDs, Aspirin 81  -WBAT/OOB with assistance as needed  -PT/OT  -FU with Dr. Alexis in 2 weeks  -DC planning: home today   -Will d/w with attending and will advise if plan changes 58yo female s/p R TKA POD #1    -Pain control  -DVT ppx: SCDs, Aspirin 81  -WBAT/OOB with assistance as needed  -PT/OT  -Ice and elevate  -Encourage incentive spirometry  -FU with Dr. Alexis in 2 weeks  -DC planning: home today   -Will d/w with attending and will advise if plan changes

## 2019-08-03 NOTE — PROGRESS NOTE ADULT - SUBJECTIVE AND OBJECTIVE BOX
Patient seen and examined at bedside, resting comfortably. Denies fevers, chills, chest pain, dyspnea, n/v. Pain well controlled. No complaints at this time.    Vital Signs Last 24 Hrs  T(C): 36.7 (03 Aug 2019 05:21), Max: 36.7 (02 Aug 2019 14:47)  T(F): 98.1 (03 Aug 2019 05:21), Max: 98.1 (03 Aug 2019 05:21)  HR: 66 (03 Aug 2019 05:21) (62 - 102)  BP: 116/66 (03 Aug 2019 05:21) (106/64 - 147/80)  BP(mean): --  RR: 16 (03 Aug 2019 05:21) (12 - 24)  SpO2: 97% (03 Aug 2019 05:10) (95% - 99%)    PHYSICAL EXAM:  RLE:   Dressing c/d/i  SCDs in place  L2-S1 SILT  + TA/EHL/FHL/GSC  + DP/PT pulses  Compartments soft and compressible  Calves nontender

## 2019-08-03 NOTE — DISCHARGE NOTE NURSING/CASE MANAGEMENT/SOCIAL WORK - NSDCDPATPORTLINK_GEN_ALL_CORE
You can access the [facility] Patient Portal, offered by [facility], by registering with the following website: [email or web address]

## 2019-08-05 PROBLEM — K76.0 FATTY (CHANGE OF) LIVER, NOT ELSEWHERE CLASSIFIED: Chronic | Status: ACTIVE | Noted: 2019-07-11

## 2019-08-05 PROBLEM — E66.9 OBESITY, UNSPECIFIED: Chronic | Status: ACTIVE | Noted: 2019-07-11

## 2019-08-05 PROBLEM — G47.33 OBSTRUCTIVE SLEEP APNEA (ADULT) (PEDIATRIC): Chronic | Status: ACTIVE | Noted: 2019-07-11

## 2019-08-05 PROBLEM — I10 ESSENTIAL (PRIMARY) HYPERTENSION: Chronic | Status: ACTIVE | Noted: 2019-07-11

## 2019-08-05 PROBLEM — M17.11 UNILATERAL PRIMARY OSTEOARTHRITIS, RIGHT KNEE: Chronic | Status: ACTIVE | Noted: 2019-07-11

## 2019-08-06 ENCOUNTER — INBOUND DOCUMENT (OUTPATIENT)
Age: 59
End: 2019-08-06

## 2019-08-06 ENCOUNTER — OTHER (OUTPATIENT)
Age: 59
End: 2019-08-06

## 2019-08-06 RX ORDER — NAPROXEN 500 MG/1
500 TABLET ORAL
Qty: 60 | Refills: 2 | Status: ACTIVE | COMMUNITY
Start: 2019-08-06 | End: 1900-01-01

## 2019-08-08 DIAGNOSIS — I10 ESSENTIAL (PRIMARY) HYPERTENSION: ICD-10-CM

## 2019-08-08 DIAGNOSIS — E78.5 HYPERLIPIDEMIA, UNSPECIFIED: ICD-10-CM

## 2019-08-08 DIAGNOSIS — K21.9 GASTRO-ESOPHAGEAL REFLUX DISEASE WITHOUT ESOPHAGITIS: ICD-10-CM

## 2019-08-08 DIAGNOSIS — E66.9 OBESITY, UNSPECIFIED: ICD-10-CM

## 2019-08-08 DIAGNOSIS — M17.11 UNILATERAL PRIMARY OSTEOARTHRITIS, RIGHT KNEE: ICD-10-CM

## 2019-08-08 DIAGNOSIS — G47.33 OBSTRUCTIVE SLEEP APNEA (ADULT) (PEDIATRIC): ICD-10-CM

## 2019-08-15 ENCOUNTER — APPOINTMENT (OUTPATIENT)
Dept: ORTHOPEDIC SURGERY | Facility: CLINIC | Age: 59
End: 2019-08-15
Payer: COMMERCIAL

## 2019-08-15 PROCEDURE — 99024 POSTOP FOLLOW-UP VISIT: CPT

## 2019-08-15 NOTE — PHYSICAL EXAM
[de-identified] : Examination reveals satisfactory wound healing. No surrounding erythema. Motion is good and relatively pain free. Active flexion is greater than 90 degrees.\par \par

## 2019-08-15 NOTE — DISCUSSION/SUMMARY
[de-identified] : Pt is s/p R TKA 8/2/19\par Dressing and one suture removed, Beonzoin spray and steri-strips applied.\par Pt advised to shower as normal, allowing the soapy water to run over the wound area.  Do not scrub.  Pat completely dry after showering.\par Written infectious precautions were reviewed. \par Aspirin therapy will be discontinued for the purpose of orthopedic thromboembolism prophylaxis. \par Return at 6 weeks from surgery for follow-up evaluation.\par \par \par

## 2019-08-15 NOTE — HISTORY OF PRESENT ILLNESS
[de-identified] : The patient returns today for initial routine evaluation after R TKA 8/2/19. Excellent progress is noted in terms of pain and restoration of function. Pain is well controlled with oral medications. There has been no change in medical health since discharge. The patient does not require assistive devices.  She has been taking ASA BID for DVT ppx.\par \par

## 2019-08-21 ENCOUNTER — RX RENEWAL (OUTPATIENT)
Age: 59
End: 2019-08-21

## 2019-08-21 RX ORDER — TRAMADOL HYDROCHLORIDE 50 MG/1
50 TABLET, COATED ORAL
Qty: 21 | Refills: 0 | Status: ACTIVE | COMMUNITY
Start: 2019-08-21 | End: 1900-01-01

## 2019-10-02 ENCOUNTER — APPOINTMENT (OUTPATIENT)
Dept: ORTHOPEDIC SURGERY | Facility: CLINIC | Age: 59
End: 2019-10-02
Payer: COMMERCIAL

## 2019-10-02 PROCEDURE — 99024 POSTOP FOLLOW-UP VISIT: CPT

## 2019-10-02 PROCEDURE — 73564 X-RAY EXAM KNEE 4 OR MORE: CPT | Mod: RT

## 2019-10-02 NOTE — PHYSICAL EXAM
[de-identified] : Examination reveals a well-healed surgical scar. No surrounding erythema. Motion is good and relatively pain free.  Active range of motion is 0 to 125 degrees.\par  [de-identified] : AP and lateral knee x-rays of the right knee were reviewed. Satisfactory position and alignment of the components are present. No signs of loosening are seen.

## 2019-10-02 NOTE — HISTORY OF PRESENT ILLNESS
[de-identified] : The patient returns today for initial routine evaluation after R TKA 8/2/19. Excellent progress is noted in terms of pain and restoration of function. The patient does not require assistive devices.  She is still working with physical therapy.  Not taking any pain medication anymore.  No pain.  Thrilled with her progress thus far.

## 2019-10-02 NOTE — DISCUSSION/SUMMARY
[de-identified] : Pt is s/p R TKA 8/2/19 doing very well.  Continue to be weightbearing as tolerated.  Continue with physical therapy.  New prescription provided.  She lost her prescription from the last visit for a cane so I gave her a new one for this even though she is no longer using a cane.  Follow-up was recommended at the 6-month anniversary from her total knee arthroplasty.\par \par \par

## 2019-11-18 ENCOUNTER — FORM ENCOUNTER (OUTPATIENT)
Age: 59
End: 2019-11-18

## 2020-02-05 ENCOUNTER — APPOINTMENT (OUTPATIENT)
Dept: ORTHOPEDIC SURGERY | Facility: CLINIC | Age: 60
End: 2020-02-05
Payer: COMMERCIAL

## 2020-02-05 VITALS — HEIGHT: 59 IN | BODY MASS INDEX: 36.69 KG/M2 | WEIGHT: 182 LBS

## 2020-02-05 DIAGNOSIS — Z96.651 PRESENCE OF RIGHT ARTIFICIAL KNEE JOINT: ICD-10-CM

## 2020-02-05 DIAGNOSIS — M17.12 UNILATERAL PRIMARY OSTEOARTHRITIS, LEFT KNEE: ICD-10-CM

## 2020-02-05 PROCEDURE — 73564 X-RAY EXAM KNEE 4 OR MORE: CPT | Mod: RT

## 2020-02-05 PROCEDURE — 99214 OFFICE O/P EST MOD 30 MIN: CPT

## 2020-02-05 NOTE — REASON FOR VISIT
[Follow-Up Visit] : a follow-up visit for [Artificial Knee Joint] : artificial knee joint [Osteoarthritis, Knee] : osteoarthritis, knee

## 2020-02-05 NOTE — DISCUSSION/SUMMARY
[de-identified] : Pt is s/p R TKA 8/2/19 doing very well.  Follow-up as recommended at the one-year anniversary from the right total knee arthroplasty for long-term follow-up.\par She has continued chronic left knee pain secondary to left knee osteoarthritis.  I had a discussion with the patient, who is a candidate for total knee replacement. Risks, benefits and alternatives were discussed. At this point, the patient wants to hold off as the pain is not quite severe enough. I gave them a book on total knee replacements and my contact card. If they want to schedule in the future, then they will come in and we will have a full discussion. \par

## 2020-02-05 NOTE — HISTORY OF PRESENT ILLNESS
[de-identified] : Pt is a very nice 61 yo F s/p R TKA 8/2/19. Excellent progress is noted in terms of pain and restoration of function. The patient does not require assistive devices.  Thrilled with her progress thus far.  She completed PT.  \par She notes continued chronic Lt knee pain, severe in intensity, exacerbated by getting up out of bed.  I have previously diagnosed her with left knee osteoarthritis.  She has tried NSAIDs in the past with no relief.  She has not done PT for the Lt knee.  She had cortisone inj in March 2019 but does not remember it well.  She does not use a knee brace.  She is not currently limited in her activities of daily living due to the Lt knee. Walking tolerance is not reduced. \par \par

## 2020-02-05 NOTE — PHYSICAL EXAM
[de-identified] : Patient is well nourished, well-developed, in no acute distress, with appropriate mood and affect. The patient is oriented to time, place, and person. Respirations are even and unlabored. Gait evaluation does not reveal a limp. There is no inguinal adenopathy. Examination of the contralateral knee shows normal range of motion, strength, no tenderness, and intact skin. The affected limb is well-perfused and showed 2+ dp/pt pulses, without skin lesions, shows a grossly normal motor and sensory examination. Knee motion is painless and the knee moves from 0-125 degrees. She is a well-healed midline surgical scar. The knee is stable within that range-of-motion to AP and ML stress. The alignment of the knee is neutral. No effusion or crepitus is noted. Muscle strength is normal. Pedal pulses are palpable. Hip examination was negative\par \par The LEFT knee motion is significantly reduced and does cause significant pain. The left knee moves from 0-125 degrees. The knee is stable within that range-of-motion to AP and ML stress. The alignment of the knee is 5 degrees of varus. Muscle strength is normal. Pedal pulses are palpable. Hip examination was negative.\par \par  [de-identified] : AP and lateral knee x-rays of the right knee were ordered and obtained in the office and demonstrate satisfactory position and alignment of the components are present. No signs of loosening are seen.\par \par Long standing knee, AP knee, lateral knee, and patellar views of the left knee were ordered and taken in the office and demonstrate degenerative joint disease of the knee with joint space narrowing, osteophyte formation, and subchondral sclerosis.

## 2020-02-05 NOTE — REVIEW OF SYSTEMS
[Negative] : Heme/Lymph [Arthralgia] : arthralgia [Joint Pain] : joint pain [Joint Stiffness] : joint stiffness

## 2022-04-28 NOTE — PATIENT PROFILE ADULT - NSPROGENSOURCEINFO_GEN_A_NUR
Ultrasound IV by Edith  :  Procedure Note    Patient meets criteria for US IV insertion. Ultrasound IV education provided to patient. Opportunities for questions given. IV ultrasound technique used for PIV placement:  20gauge 1.75 in [address]. 1 X Attempt(s). Flushed with ease; vigorous blood return. Procedure tolerated well. Primary RN aware of IV placement and added to LDA.                                 Alicia Mckinney, RADHA patient

## 2023-09-15 ASSESSMENT — KOOS JR
GOING UP OR DOWN STAIRS: MODERATE
IMPORTED FORM: YES
IMPORTED FORM: YES
BENDING TO THE FLOOR TO PICK UP OBJECT: SEVERE
IMPORTED KOOS JR SCORE: 84.6
GOING UP OR DOWN STAIRS: MILD
IMPORTED LATERALITY: RIGHT
GOING UP OR DOWN STAIRS: MODERATE
IMPORTED KOOS JR SCORE: 44.91
STANDING UPRIGHT: MODERATE
STANDING UPRIGHT: MILD
GOING UP OR DOWN STAIRS: SEVERE
HOW SEVERE IS YOUR KNEE STIFFNESS AFTER FIRST WAKING IN MORNING: MILD
KOOS JR RAW SCORE: 17
RISING FROM SITTING: MILD
RISING FROM SITTING: MODERATE
IMPORTED KOOS JR SCORE: 44.91
STRAIGHTENING KNEE FULLY: MODERATE
KOOS JR RAW SCORE: 10
RISING FROM SITTING: MILD
IMPORTED KOOS JR SCORE: 61.58
BENDING TO THE FLOOR TO PICK UP OBJECT: MILD
TWISING OR PIVOTING ON KNEE: MILD
HOW SEVERE IS YOUR KNEE STIFFNESS AFTER FIRST WAKING IN MORNING: MODERATE
TWISING OR PIVOTING ON KNEE: SEVERE
KOOS JR RAW SCORE: 2
HOW SEVERE IS YOUR KNEE STIFFNESS AFTER FIRST WAKING IN MORNING: MODERATE
HOW SEVERE IS YOUR KNEE STIFFNESS AFTER FIRST WAKING IN MORNING: MILD
STANDING UPRIGHT: MODERATE
BENDING TO THE FLOOR TO PICK UP OBJECT: MILD
IMPORTED KOOS JR SCORE: 84.6
STANDING UPRIGHT: MILD
STRAIGHTENING KNEE FULLY: MODERATE
RISING FROM SITTING: MODERATE
BENDING TO THE FLOOR TO PICK UP OBJECT: SEVERE
GOING UP OR DOWN STAIRS: SEVERE
IMPORTED KOOS JR SCORE: 61.58
KOOS JR RAW SCORE: 10
KOOS JR RAW SCORE: 2
GOING UP OR DOWN STAIRS: MILD
IMPORTED KOOS JR SCORE: 73.34
KOOS JR RAW SCORE: 17
IMPORTED LATERALITY: RIGHT
TWISING OR PIVOTING ON KNEE: SEVERE
KOOS JR RAW SCORE: 5
IMPORTED KOOS JR SCORE: 73.34
TWISING OR PIVOTING ON KNEE: MILD
KOOS JR RAW SCORE: 5

## 2023-11-30 NOTE — OCCUPATIONAL THERAPY INITIAL EVALUATION ADULT - ADDITIONAL COMMENTS
mild Pre op assessment confirmed- Patient lives in a private house with 7-8 steps to enter with bilateral handrails that are close together. Once inside, the patients bedroom and bathroom is on the main level when entering. The patient bathroom has a walk in shower stall with a retractable shower head with a regular toilet. The patient reports that she can fit a 3/1 commode over the toilet. The patient ambulates with no device and does not own any device for ambulation. The patient is R handed, drives and does not wear glasses. The patient daily pain at rest is a 5/10 and a 9/10 with movement. The patient reports taking no pain medicine and uses ice and exercise.  The patient reports having (+) buckling and (+) falls history.

## 2023-12-21 NOTE — ASU PREOP CHECKLIST - WEIGHT IN LBS
[FreeTextEntry1] : Atrial fibrillation.  Stable.  Followed by cardiologist.  Continue Eliquis 2.5 mg twice a day. Aortic stenosis.  Followed by cardiologist. Osteopenia.  DEXA Hypertension.  Continue HCTZ 12.5 mg daily. TSH and free T4 results from last week were reviewed with patient. Hemoglobin A1c result from last week was reviewed with patient. Patient was instructed that COVID 19 vaccine is once a year in the fall.  I recommend COVID-19 booster vaccine ASAP for patients who did not receive COVID-vaccine this past fall.   CPE in 6/2024 .  
179.8